# Patient Record
Sex: FEMALE | Race: WHITE | NOT HISPANIC OR LATINO | Employment: FULL TIME | RURAL
[De-identification: names, ages, dates, MRNs, and addresses within clinical notes are randomized per-mention and may not be internally consistent; named-entity substitution may affect disease eponyms.]

---

## 2020-06-04 ENCOUNTER — HISTORICAL (OUTPATIENT)
Dept: ADMINISTRATIVE | Facility: HOSPITAL | Age: 50
End: 2020-06-04

## 2020-11-10 ENCOUNTER — HISTORICAL (OUTPATIENT)
Dept: ADMINISTRATIVE | Facility: HOSPITAL | Age: 50
End: 2020-11-10

## 2020-11-11 LAB — SARS-COV+SARS-COV-2 AG RESP QL IA.RAPID: NEGATIVE

## 2021-02-10 ENCOUNTER — HISTORICAL (OUTPATIENT)
Dept: ADMINISTRATIVE | Facility: HOSPITAL | Age: 51
End: 2021-02-10

## 2021-02-12 LAB
DSDNA IGG SERPL IA-ACNC: 13 IU (ref 0–24)
DSDNA IGG SERPL IA-ACNC: NEGATIVE [IU]/ML
ENA AB SER QL IA: 2.7 EUS (ref 0–19.9)
ENA AB SER QL IA: NEGATIVE

## 2021-03-12 ENCOUNTER — HISTORICAL (OUTPATIENT)
Dept: ADMINISTRATIVE | Facility: HOSPITAL | Age: 51
End: 2021-03-12

## 2021-03-16 LAB
LAB AP CLINICAL INFORMATION: NORMAL
LAB AP GENERAL CAT - HISTORICAL: NORMAL
LAB AP INTERPRETATION/RESULT - HISTORICAL: NEGATIVE
LAB AP SPECIMEN ADEQUACY - HISTORICAL: NORMAL
LAB AP SPECIMEN SUBMITTED - HISTORICAL: NORMAL
PAP RECOMMENDATION EXT: NORMAL

## 2021-07-12 RX ORDER — TRIAMCINOLONE ACETONIDE 1 MG/G
OINTMENT TOPICAL 2 TIMES DAILY
Qty: 80 G | Refills: 5 | Status: SHIPPED | OUTPATIENT
Start: 2021-07-12 | End: 2023-04-18

## 2023-04-18 ENCOUNTER — OFFICE VISIT (OUTPATIENT)
Dept: FAMILY MEDICINE | Facility: CLINIC | Age: 53
End: 2023-04-18
Payer: COMMERCIAL

## 2023-04-18 VITALS
TEMPERATURE: 97 F | HEART RATE: 96 BPM | SYSTOLIC BLOOD PRESSURE: 138 MMHG | RESPIRATION RATE: 18 BRPM | HEIGHT: 67 IN | WEIGHT: 282 LBS | OXYGEN SATURATION: 97 % | BODY MASS INDEX: 44.26 KG/M2 | DIASTOLIC BLOOD PRESSURE: 88 MMHG

## 2023-04-18 DIAGNOSIS — Z86.39 HISTORY OF ELEVATED GLUCOSE: ICD-10-CM

## 2023-04-18 DIAGNOSIS — E66.01 MORBID OBESITY WITH BMI OF 40.0-44.9, ADULT: Primary | ICD-10-CM

## 2023-04-18 DIAGNOSIS — I10 HYPERTENSION, UNSPECIFIED TYPE: ICD-10-CM

## 2023-04-18 DIAGNOSIS — F41.1 GENERALIZED ANXIETY DISORDER: ICD-10-CM

## 2023-04-18 DIAGNOSIS — R53.83 FATIGUE, UNSPECIFIED TYPE: ICD-10-CM

## 2023-04-18 DIAGNOSIS — E88.810 METABOLIC SYNDROME: ICD-10-CM

## 2023-04-18 DIAGNOSIS — E55.9 VITAMIN D DEFICIENCY: ICD-10-CM

## 2023-04-18 DIAGNOSIS — E78.2 MIXED HYPERLIPIDEMIA: ICD-10-CM

## 2023-04-18 LAB
25(OH)D3 SERPL-MCNC: 28 NG/ML
ALBUMIN SERPL BCP-MCNC: 3.9 G/DL (ref 3.5–5)
ALBUMIN/GLOB SERPL: 1 {RATIO}
ALP SERPL-CCNC: 68 U/L (ref 41–108)
ALT SERPL W P-5'-P-CCNC: 36 U/L (ref 13–56)
ANION GAP SERPL CALCULATED.3IONS-SCNC: 9 MMOL/L (ref 7–16)
AST SERPL W P-5'-P-CCNC: 24 U/L (ref 15–37)
BASOPHILS # BLD AUTO: 0.04 K/UL (ref 0–0.2)
BASOPHILS NFR BLD AUTO: 0.6 % (ref 0–1)
BILIRUB SERPL-MCNC: 0.7 MG/DL (ref ?–1.2)
BUN SERPL-MCNC: 8 MG/DL (ref 7–18)
BUN/CREAT SERPL: 13 (ref 6–20)
CALCIUM SERPL-MCNC: 9.4 MG/DL (ref 8.5–10.1)
CHLORIDE SERPL-SCNC: 104 MMOL/L (ref 98–107)
CHOLEST SERPL-MCNC: 215 MG/DL (ref 0–200)
CHOLEST/HDLC SERPL: 3.8 {RATIO}
CO2 SERPL-SCNC: 32 MMOL/L (ref 21–32)
CREAT SERPL-MCNC: 0.62 MG/DL (ref 0.55–1.02)
DIFFERENTIAL METHOD BLD: ABNORMAL
EGFR (NO RACE VARIABLE) (RUSH/TITUS): 109 ML/MIN/1.73M²
EOSINOPHIL # BLD AUTO: 0.12 K/UL (ref 0–0.5)
EOSINOPHIL NFR BLD AUTO: 1.7 % (ref 1–4)
ERYTHROCYTE [DISTWIDTH] IN BLOOD BY AUTOMATED COUNT: 12.9 % (ref 11.5–14.5)
EST. AVERAGE GLUCOSE BLD GHB EST-MCNC: 87 MG/DL
GLOBULIN SER-MCNC: 3.8 G/DL (ref 2–4)
GLUCOSE SERPL-MCNC: 65 MG/DL (ref 74–106)
HBA1C MFR BLD HPLC: 5.2 % (ref 4.5–6.6)
HCT VFR BLD AUTO: 43.4 % (ref 38–47)
HDLC SERPL-MCNC: 57 MG/DL (ref 40–60)
HGB BLD-MCNC: 14.2 G/DL (ref 12–16)
IMM GRANULOCYTES # BLD AUTO: 0.01 K/UL (ref 0–0.04)
IMM GRANULOCYTES NFR BLD: 0.1 % (ref 0–0.4)
LDLC SERPL CALC-MCNC: 98 MG/DL
LDLC/HDLC SERPL: 1.7 {RATIO}
LYMPHOCYTES # BLD AUTO: 3.35 K/UL (ref 1–4.8)
LYMPHOCYTES NFR BLD AUTO: 46.7 % (ref 27–41)
MCH RBC QN AUTO: 30.3 PG (ref 27–31)
MCHC RBC AUTO-ENTMCNC: 32.7 G/DL (ref 32–36)
MCV RBC AUTO: 92.7 FL (ref 80–96)
MONOCYTES # BLD AUTO: 0.75 K/UL (ref 0–0.8)
MONOCYTES NFR BLD AUTO: 10.4 % (ref 2–6)
MPC BLD CALC-MCNC: 8.3 FL (ref 9.4–12.4)
NEUTROPHILS # BLD AUTO: 2.91 K/UL (ref 1.8–7.7)
NEUTROPHILS NFR BLD AUTO: 40.5 % (ref 53–65)
NONHDLC SERPL-MCNC: 158 MG/DL
NRBC # BLD AUTO: 0 X10E3/UL
NRBC, AUTO (.00): 0 %
PLATELET # BLD AUTO: 418 K/UL (ref 150–400)
POTASSIUM SERPL-SCNC: 4.5 MMOL/L (ref 3.5–5.1)
PROT SERPL-MCNC: 7.7 G/DL (ref 6.4–8.2)
RBC # BLD AUTO: 4.68 M/UL (ref 4.2–5.4)
SODIUM SERPL-SCNC: 140 MMOL/L (ref 136–145)
TRIGL SERPL-MCNC: 301 MG/DL (ref 35–150)
TSH SERPL DL<=0.005 MIU/L-ACNC: 0.91 UIU/ML (ref 0.36–3.74)
VLDLC SERPL-MCNC: 60 MG/DL
WBC # BLD AUTO: 7.18 K/UL (ref 4.5–11)

## 2023-04-18 PROCEDURE — 83036 HEMOGLOBIN GLYCOSYLATED A1C: CPT | Mod: ,,, | Performed by: CLINICAL MEDICAL LABORATORY

## 2023-04-18 PROCEDURE — 85025 COMPLETE CBC W/AUTO DIFF WBC: CPT | Mod: ,,, | Performed by: CLINICAL MEDICAL LABORATORY

## 2023-04-18 PROCEDURE — 80053 COMPREHEN METABOLIC PANEL: CPT | Mod: ,,, | Performed by: CLINICAL MEDICAL LABORATORY

## 2023-04-18 PROCEDURE — 85025 CBC WITH DIFFERENTIAL: ICD-10-PCS | Mod: ,,, | Performed by: CLINICAL MEDICAL LABORATORY

## 2023-04-18 PROCEDURE — 84443 ASSAY THYROID STIM HORMONE: CPT | Mod: ,,, | Performed by: CLINICAL MEDICAL LABORATORY

## 2023-04-18 PROCEDURE — 82306 VITAMIN D 25 HYDROXY: CPT | Mod: ,,, | Performed by: CLINICAL MEDICAL LABORATORY

## 2023-04-18 PROCEDURE — 83036 HEMOGLOBIN A1C: ICD-10-PCS | Mod: ,,, | Performed by: CLINICAL MEDICAL LABORATORY

## 2023-04-18 PROCEDURE — 82306 VITAMIN D: ICD-10-PCS | Mod: ,,, | Performed by: CLINICAL MEDICAL LABORATORY

## 2023-04-18 PROCEDURE — 80061 LIPID PANEL: ICD-10-PCS | Mod: ,,, | Performed by: CLINICAL MEDICAL LABORATORY

## 2023-04-18 PROCEDURE — 99204 OFFICE O/P NEW MOD 45 MIN: CPT | Mod: ,,, | Performed by: NURSE PRACTITIONER

## 2023-04-18 PROCEDURE — 80053 COMPREHENSIVE METABOLIC PANEL: ICD-10-PCS | Mod: ,,, | Performed by: CLINICAL MEDICAL LABORATORY

## 2023-04-18 PROCEDURE — 99204 PR OFFICE/OUTPT VISIT, NEW, LEVL IV, 45-59 MIN: ICD-10-PCS | Mod: ,,, | Performed by: NURSE PRACTITIONER

## 2023-04-18 PROCEDURE — 84443 TSH: ICD-10-PCS | Mod: ,,, | Performed by: CLINICAL MEDICAL LABORATORY

## 2023-04-18 PROCEDURE — 80061 LIPID PANEL: CPT | Mod: ,,, | Performed by: CLINICAL MEDICAL LABORATORY

## 2023-04-18 RX ORDER — BUPROPION HYDROCHLORIDE 150 MG/1
150 TABLET ORAL DAILY
Qty: 30 TABLET | Refills: 2 | Status: SHIPPED | OUTPATIENT
Start: 2023-04-18 | End: 2023-06-01

## 2023-04-18 RX ORDER — PAROXETINE HYDROCHLORIDE 40 MG/1
40 TABLET, FILM COATED ORAL
COMMUNITY
Start: 2023-01-02 | End: 2023-04-18

## 2023-04-18 RX ORDER — HYDROCHLOROTHIAZIDE 12.5 MG/1
TABLET ORAL
COMMUNITY
End: 2023-04-18

## 2023-04-18 RX ORDER — LISINOPRIL 20 MG/1
20 TABLET ORAL DAILY
Qty: 30 TABLET | Refills: 2 | Status: SHIPPED | OUTPATIENT
Start: 2023-04-18 | End: 2023-08-08 | Stop reason: SDUPTHER

## 2023-04-18 RX ORDER — HYDROCHLOROTHIAZIDE 25 MG/1
25 TABLET ORAL DAILY
Qty: 30 TABLET | Refills: 2 | Status: SHIPPED | OUTPATIENT
Start: 2023-04-18 | End: 2024-04-17

## 2023-04-18 RX ORDER — PHENTERMINE HYDROCHLORIDE 37.5 MG/1
37.5 TABLET ORAL
Qty: 30 TABLET | Refills: 0 | Status: SHIPPED | OUTPATIENT
Start: 2023-04-18 | End: 2023-06-01

## 2023-04-19 RX ORDER — ATORVASTATIN CALCIUM 20 MG/1
20 TABLET, FILM COATED ORAL DAILY
Qty: 90 TABLET | Refills: 3 | Status: SHIPPED | OUTPATIENT
Start: 2023-04-19 | End: 2024-04-18

## 2023-04-19 RX ORDER — ERGOCALCIFEROL 1.25 MG/1
50000 CAPSULE ORAL
Qty: 12 CAPSULE | Refills: 1 | Status: SHIPPED | OUTPATIENT
Start: 2023-04-19

## 2023-05-08 PROBLEM — Z86.39 HISTORY OF ELEVATED GLUCOSE: Status: ACTIVE | Noted: 2023-05-08

## 2023-05-08 PROBLEM — R53.83 FATIGUE: Status: ACTIVE | Noted: 2023-05-08

## 2023-05-08 PROBLEM — E66.01 MORBID OBESITY WITH BMI OF 40.0-44.9, ADULT: Status: ACTIVE | Noted: 2023-05-08

## 2023-05-08 PROBLEM — E55.9 VITAMIN D DEFICIENCY: Status: ACTIVE | Noted: 2023-05-08

## 2023-05-08 PROBLEM — F41.1 GENERALIZED ANXIETY DISORDER: Status: ACTIVE | Noted: 2023-05-08

## 2023-05-08 PROBLEM — E78.2 MIXED HYPERLIPIDEMIA: Status: ACTIVE | Noted: 2023-05-08

## 2023-05-08 PROBLEM — I10 HYPERTENSION: Status: ACTIVE | Noted: 2023-05-08

## 2023-05-08 NOTE — ASSESSMENT & PLAN NOTE
EMERGENCY DEPARTMENT ENCOUNTER      NAME: Adalgisa Solano  AGE: 86 year old female  YOB: 1935  MRN: [id number]  EVALUATION DATE & TIME: 5/5/2022  2:11 AM    PCP: Shai Ellington    ED PROVIDER: Anoop Justice M.D.      Chief Complaint   Patient presents with     Hypoxia         FINAL IMPRESSION:  1. Pneumonia of left lower lobe due to infectious organism    2. COPD exacerbation (H)    3. Hypoxia          ED COURSE & MEDICAL DECISION MAKING:    Pertinent Labs & Imaging studies reviewed. (See chart for details)  86 year old female presents to the Emergency Department for evaluation of shortness of breath.  Patient was hypoxic on arrival down into the 80s.  Very tight.  Did give her prednisone, DuoNeb and albuterol with some improvement.  Still requiring facemask for oxygenation.  Did do a CT scan of her chest as it was unclear if she had been on anticoagulation or not.  There is no PE but there is a left lower lobe infiltrate.  Of note her INR is up to 3.26.  Unclear what is causing this as it looks like she had been on Xarelto I do not see Coumadin listed.  Patient also found to have a sodium 128.  Will be admitted for pneumonia.  Discussed with the resident accepted patient.  Given IV antibiotics for likely hospital-acquired pneumonia.    2:12 AM I met with the patient to gather history and to perform my initial exam. I discussed the plan for care while in the Emergency Department. PPE: gloves, N95, and eye protection.  4:39 AM I discussed case with Dr. Reza, resident.    At the conclusion of the encounter I discussed the results of all of the tests and the disposition. The questions were answered. The patient or family acknowledged understanding and was agreeable with the care plan.       Critical Care     Performed by: Dr Anoop Justice  Authorized by: Dr Anoop Justice  Total critical care time: 90 minutes  Critical care was necessary to treat or prevent imminent or life-threatening  Start wellbutrin 150 mg daily   deterioration of the following conditions:  Respiratory failure with hypoxia  Critical care was time spent personally by me on the following activities: development of treatment plan with patient or surrogate, discussions with consultants, examination of patient, evaluation of patient's response to treatment, obtaining history from patient or surrogate, ordering and performing treatments and interventions, ordering and review of laboratory studies, ordering and review of radiographic studies, re-evaluation of patient's condition and monitoring for potential decompensation.  Critical care time was exclusive of separately billable procedures and treating other patients.     MEDICATIONS GIVEN IN THE EMERGENCY:  Medications   ceFEPIme (MAXIPIME) 2 g vial to attach to  ml bag for ADULTS or 50 ml bag for PEDS (2 g Intravenous New Bag 5/5/22 5976)   ipratropium - albuterol 0.5 mg/2.5 mg/3 mL (DUONEB) neb solution 3 mL (3 mLs Nebulization Given 5/5/22 0230)   albuterol (PROVENTIL) neb solution 5 mg (5 mg Nebulization Given 5/5/22 0230)   predniSONE (DELTASONE) tablet 60 mg (60 mg Oral Given 5/5/22 0251)   iopamidol (ISOVUE-370) solution 100 mL (75 mLs Intravenous Given 5/5/22 0315)       NEW PRESCRIPTIONS STARTED AT TODAY'S ER VISIT  New Prescriptions    No medications on file          =================================================================    HPI    Patient information was obtained from: Patient    Use of : N/A        Adalgisa Solano is a 86 year old female with a pertinent history of chronic atrial fibrillation, HTN, chronic heart failure with preserved ejection fraction, dyslipidemia, peripheral arterial disease, pulmonary hypertension, DM2, COPD, GERD, chronic pain, who presents to this ED via EMS for evaluation of shortness of breath, hypoxia.  Patient found to be hypoxic tonight.  Having some worsening shortness of breath.  Denies any worsening cough.  Has been tapering off of  prednisone.  Now only on 5 mg.  No longer on antibiotics.  Denies any chest pain.  No abdominal pain.  Some nausea but no vomiting.  Denies any fever.  Patient does have a history of A. fib.  Unclear if she has been on anticoagulation.  Did not see it noted in her paperwork from her care facility    Per chart review, patient was seen at [facility] from 4/13 - 4/22 for complex and persistent bronchiectasis with exacerbation. Admitted for worsening shortness of breath with productive cough secondary to bronchiectasis and COPD exacerbation and right lower lobe pneumonia. Patient was treated with IV systemic steroids now switched to tapered dose of oral steroid. Resumed vest therapy, scheduled bronchodilator/DuoNeb 4 times a day. Sputum culture grew Pseudomonas and Enterococcus. On IV cefepime until 4/23. Tested positive for coronavirus, but negative for COVID-19. Chronically on 2 to 3 L of oxygen now on 4 L. Resume gentle diuresis, was on Lasix 40 mg every other day now on daily. Echocardiogram was unremarkable. Evaluated by pulmonology and palliative care. Recommended to follow-up with pulmonology closely as outpatient.  Patient also has recurrent anxiety.  Ativan 0.25 mg every 6 hours as needed for acute anxiety, mirtazapine 7.5 mg daily for 1 week then 15 mg daily, Seroquel 12.5 mg twice a day for 3 days started. Resume Abilify 5 mg daily. Patient has chronic atrial fibrillation. Diltiazem dose increased to 240 mg daily for tachycardia. Patient discharged to skilled nursing facility.    REVIEW OF SYSTEMS   Review of Systems   Constitutional: Negative for fever.   Respiratory: Positive for cough and shortness of breath.    Cardiovascular: Negative for chest pain.   Gastrointestinal: Positive for nausea. Negative for abdominal pain.   All other systems reviewed and are negative.       PAST MEDICAL HISTORY:  Past Medical History:   Diagnosis Date     Acute and chronic respiratory failure with hypoxia (H)       Acute blood loss anemia 1/15/2019     Acute bronchitis 1/15/2019     Anemia     pernicious anemia     Anticoagulated 3/23/2022     Anxiety      Arm skin lesion, right      Arnold-Chiari malformation (H) 1998     Arthritis      Atrial fibrillation (H) 02/2017     Avascular necrosis of bone (H)      Breast cyst      Breast lump      Candidal skin infection      Cellulitis of left lower extremity 1/28/2019     CHF (congestive heart failure) (H)     diastolic and systolic     Chronic bronchitis (H)     & acute     Chronic diastolic heart failure (H)     diastolic chf     Chronic pain syndrome      Chronic respiratory failure with hypoxia (H) 3/13/2017     COPD (chronic obstructive pulmonary disease) (H)      COPD exacerbation (H)      Depression      Diet-controlled type 2 diabetes mellitus (H)      Drug induced constipation      DVT of axillary vein, acute (H)     left     DVT of axillary vein, acute left (H)      Edema      Encounter for palliative care      Erysipelas      Fracture of femoral neck, left (H)      Generalized muscle weakness      GERD (gastroesophageal reflux disease)      History of blood clots      Hyperlipidemia      Hypertension      Left hip pain      Lumbar spinal stenosis      PAD (peripheral artery disease) (H)      Peripheral arterial disease (H) 10/28/2015     Psoriasis     arms      Pulmonary hypertension (H) 3/5/2018     Recurrent major depressive episodes (H)     Created by Conversion  Replacement Utility updated for latest IMO load     Schizoaffective disorder, bipolar type (H) 6/11/2015     Slow transit constipation      Stasis dermatitis of both legs      Status post total hip replacement, left 1/3/2019     Type 2 diabetes mellitus without complication (H) 8/2/2016     Vitamin B12 deficiency      Volume overload        PAST SURGICAL HISTORY:  Past Surgical History:   Procedure Laterality Date     BACK SURGERY       BREAST CYST ASPIRATION Left 1994     CERVICAL SPINE SURGERY      for  arnold chiari malformation      SECTION  X3     CHOLECYSTECTOMY       COLONOSCOPY       EXCISE LESION UPPER EXTREMITY Right 2019    Procedure: EXCISION RIGHT ARM LIPOMA;  Surgeon: Andreas Holly MD;  Location: [facility];  Service: General     EYE SURGERY      bilateral cataracts     HAND SURGERY       HERNIORRHAPHY UMBILICAL N/A 2021    Procedure: INCARCERATED UMBILICAL AND;  Surgeon: Andreas Holly MD;  Location: [facility] - [place], [place]     JOINT REPLACEMENT Right     knee - partial     LAPAROSCOPIC HERNIORRHAPHY INCISIONAL Left 3/27/2015    Procedure: ATTEMPTED LAPAROSCOPIC ABDOMINA/FLANK INCISION REPAIR;  Surgeon: Jose Lakhani MD;  Location: [facility] OR;  Service:      LUMBAR FUSION N/A 2014    Procedure: POSTERIOR FUSION / DECOMPRESSION L3-S1 WITH PELVIC FIXATION BILATERAL ;  Surgeon: Rajan Mares MD;  Location: [facility] [place];  Service:      OTHER SURGICAL HISTORY      IR for PAD LOWER EXTREMITY     REPAIR SPIGELIAN HERNIA N/A 2021    Procedure: SPIGELIAN HERNIA REPAIR;  Surgeon: Andreas Holly MD;  Location: [facility] [place], [place]     US ASPIRATION HEMATOMA SEROMA OR FLUID COLLECTION  2020     [facility] OPEN FIXATN PROX END/NECK FEMUR FX Left 2019    Procedure: OPEN REDUCTION INTERNAL FIXATION, FRACTURE LEFT HIP, PERIPROSTHETIC FRACTURE;  Surgeon: Kevin Lackey MD;  Location: [facility] OR;  Service: Orthopedics     [facility] TOTAL HIP ARTHROPLASTY Left 2019    Procedure: LEFT TOTAL HIP ARTHROPLASTY;  Surgeon: Kevin Lakcey MD;  Location: [facility] OR;  Service: Orthopedics     [facility] TOTAL HIP ARTHROPLASTY Right 2019    Procedure: RIGHT TOTAL HIP ARTHROPLASTY;  Surgeon: Kan Quesada MD;  Location: [facility] OR;  Service: Orthopedics     [facility] TOTAL KNEE ARTHROPLASTY Left 10/7/2016    Procedure: TOTAL KNEE ARTHROPLASTY, LEFT;  Surgeon: Kan Quesada MD;  Location: [facility] OR;  Service: Orthopedics           CURRENT MEDICATIONS:     Current Facility-Administered Medications   Medication     ceFEPIme (MAXIPIME) 2 g vial to attach to  ml bag for ADULTS or 50 ml bag for PEDS     Current Outpatient Medications   Medication     acetaminophen (TYLENOL) 500 MG tablet     albuterol (PROVENTIL) (2.5 MG/3ML) 0.083% neb solution     ARIPiprazole (ABILIFY) 5 MG tablet     atorvastatin (LIPITOR) 20 MG tablet     Budeson-Glycopyrrol-Formoterol (BREZTRI AEROSPHERE) 160-9-4.8 MCG/ACT AERO     calcium carbonate 600 mg-vitamin D 400 units (CALTRATE) 600-400 MG-UNIT per tablet     cyanocobalamin (VITAMIN B-12) 500 MCG tablet     diltiazem ER (DILT-XR) 240 MG 24 hr ER beaded capsule     docusate sodium (COLACE) 100 MG capsule     furosemide (LASIX) 40 MG tablet     gabapentin (NEURONTIN) 300 MG capsule     guaiFENesin (MUCINEX) 600 MG 12 hr tablet     insulin aspart (NOVOLOG PEN) 100 UNIT/ML pen     ipratropium - albuterol 0.5 mg/2.5 mg/3 mL (DUONEB) 0.5-2.5 (3) MG/3ML neb solution     LORazepam (ATIVAN) 0.5 MG tablet     melatonin 1 MG TABS tablet     metFORMIN (GLUCOPHAGE) 500 MG tablet     mirtazapine (REMERON) 7.5 MG tablet     mirtazapine (REMERON) 7.5 MG tablet     omeprazole (PRILOSEC) 20 MG DR capsule     phenylephrine-shark liver oil-mineral oil-petrolatum (PREPARATION H) 0.25-3-14-71.9 % rectal ointment     polyethylene glycol (MIRALAX) 17 GM/Dose powder     predniSONE (DELTASONE) 10 MG tablet     QUEtiapine (SEROQUEL) 25 MG tablet     sodium chloride (NEBUSAL) 3 % neb solution     spironolactone (ALDACTONE) 25 MG tablet     Vitamin D3 (CHOLECALCIFEROL) 25 mcg (1000 units) tablet     XARELTO ANTICOAGULANT 20 MG TABS tablet         ALLERGIES:  Allergies   Allergen Reactions     Tramadol Nausea     Tuberculin Tests      Amoxicillin Itching and Rash     Levofloxacin Itching and Rash     Penicillins Itching and Rash     Has tolerated ceftriaxone.       FAMILY HISTORY:  Family History   Problem Relation Age of Onset     Coronary Artery Disease Mother       Coronary Artery Disease Father        SOCIAL HISTORY:   Social History     Socioeconomic History     Marital status:    Tobacco Use     Smoking status: Former Smoker     Quit date: 2006     Years since quittin.3     Smokeless tobacco: Never Used     Tobacco comment: quit 2006   Vaping Use     Vaping Use: Never used   Substance and Sexual Activity     Alcohol use: No     Drug use: No   Social History Narrative           VITALS:  /58   Pulse 96   Temp 98.3  F (36.8  C) (Oral)   Resp 29   SpO2 98%     PHYSICAL EXAM    Physical Exam  Constitutional:       General: She is in acute distress.      Appearance: She is ill-appearing. She is not diaphoretic.   HENT:      Head: Atraumatic.      Mouth/Throat:      Pharynx: No oropharyngeal exudate.   Eyes:      General: No scleral icterus.     Pupils: Pupils are equal, round, and reactive to light.   Cardiovascular:      Rate and Rhythm: Tachycardia present.      Heart sounds: Normal heart sounds.   Pulmonary:      Effort: Respiratory distress present.      Breath sounds: Wheezing present.   Abdominal:      Palpations: Abdomen is soft.      Tenderness: There is no abdominal tenderness. There is no guarding or rebound.   Musculoskeletal:         General: No tenderness.   Skin:     General: Skin is warm.      Findings: No rash.   Neurological:      General: No focal deficit present.      Mental Status: She is alert.           LAB:  All pertinent labs reviewed and interpreted.  Labs Ordered and Resulted from Time of ED Arrival to Time of ED Departure   COMPREHENSIVE METABOLIC PANEL - Abnormal       Result Value    Sodium 128 (*)     Potassium 4.8      Chloride 86 (*)     Carbon Dioxide (CO2) 30      Anion Gap 12      Urea Nitrogen 15      Creatinine 0.65      Calcium 8.6      Glucose 137 (*)     Alkaline Phosphatase 58      AST 21      ALT 29      Protein Total 5.9 (*)     Albumin 2.8 (*)     Bilirubin Total 0.9      GFR Estimate 86     INR -  Abnormal    INR 3.26 (*)    PARTIAL THROMBOPLASTIN TIME - Abnormal    aPTT 45 (*)    CBC WITH PLATELETS AND DIFFERENTIAL - Abnormal    WBC Count 10.1      RBC Count 3.97      Hemoglobin 11.6 (*)     Hematocrit 36.5      MCV 92      MCH 29.2      MCHC 31.8      RDW 15.7 (*)     Platelet Count 208      % Neutrophils 86      % Lymphocytes 5      % Monocytes 7      % Eosinophils 1      % Basophils 0      % Immature Granulocytes 1      NRBCs per 100 WBC 0      Absolute Neutrophils 8.8 (*)     Absolute Lymphocytes 0.5 (*)     Absolute Monocytes 0.7      Absolute Eosinophils 0.1      Absolute Basophils 0.0      Absolute Immature Granulocytes 0.1      Absolute NRBCs 0.0     TROPONIN I - Normal    Troponin I 0.01     B-TYPE NATRIURETIC PEPTIDE ( EAST ONLY) - Normal         INFLUENZA A/B & SARS-COV2 PCR MULTIPLEX   BLOOD CULTURE   BLOOD CULTURE       RADIOLOGY:  Reviewed all pertinent imaging. Please see official radiology report.  CT Chest Pulmonary Embolism w Contrast   Final Result   IMPRESSION:   1.  No evidence of pulmonary embolus.   2.  Left lower lobe predominant new airspace disease compatible with pneumonia or aspiration. Follow-up to resolution recommended.   3.  Likely stable right upper lobe pulmonary nodules measuring up to 11 mm in size. Continued short-term follow-up CT versus PET CT recommended per Fleischner Society guidelines.      REFERENCE:   Guidelines for Management of Incidental Pulmonary Nodules Detected on CT Images: From the Fleischner Society 2017.    Guidelines apply to incidental nodules in patients who are 35 years or older.   Guidelines do not apply to lung cancer screening, patients with immunosuppression, or patients with known primary cancer.      Nodule size 6 mm or larger   Low-risk patients: Follow-up CT at 3-6 months, then consider CT at 18-24 months.   High-risk patients: Follow-up CT at 3-6 months, then at 18-24 months if no change.   -Use most suspicious nodule as guide to  management.      Consider referral to lung nodule clinic.          EKG:    Performed at: 218  Impression: Atrial fibrillation with ventricular to 115.  Compared to previous heart rate is increased otherwise unchanged.  This is dated April 21, 2022  A. fib with a rate of 115.  QRS 72.  QTc 450    I have independently reviewed and interpreted the EKG(s) documented above.    PROCEDURES:         I, Mar Quick, am serving as a scribe to document services personally performed by Dr. Anoop Justice, based on my observation and the provider's statements to me. I, Anoop Justice MD attest that Mar Quick is acting in a scribe capacity, has observed my performance of the services and has documented them in accordance with my direction.    Anoop Justice M.D.  Emergency Medicine  [facility] EMERGENCY ROOM  [address] [id number] [phone]  Dept: [phone]     Anoop Justice MD  05/05/22 1912

## 2023-05-08 NOTE — ASSESSMENT & PLAN NOTE
adipex po daily  Discussed low calorie diet   Exercise at least 30 min daily x 5 days weekly  Increase water intake    Follow up 1 mo

## 2023-05-08 NOTE — PROGRESS NOTES
MILENA Nation   [facility]  [address] [address] [address]  [phone]      PATIENT NAME: Linnea Flowers  : 1970  DATE: 23  MRN: [id number]      Billing Provider: MILENA Nation  Level of Service:   Patient PCP Information       Provider PCP Type    MILENA Nation General            Reason for Visit / Chief Complaint: Establish Care (States her paroxetine 40mg does not seem to be helping. ) and Weight Loss (Pt is wanting to discuss weight loss options. /States she has tried adipex in the past and it helped, but caused constipation. Is willing to retry if there is no other option. /States she was diagnosed as pre diabetic. Pt states she has not had an A1C drawn in years. )       Update PCP  Update Chief Complaint         History of Present Illness / Problem Focused Workflow     52 year old female presents to establish care  States she has been on paxil for many years but feels it is not helping  She complaints of having fatigue as well  Reports she has taken adipex in the past but did cause some constipation  Blood pressure is  a little elevated today     Hx of hypertension, hyperlipidemia      Review of Systems     Review of Systems   Constitutional:  Positive for fatigue. Negative for fever.   HENT:  Negative for congestion.    Respiratory:  Negative for cough and shortness of breath.    Cardiovascular:  Negative for palpitations.   Gastrointestinal:  Negative for abdominal pain, constipation and diarrhea.   Endocrine: Negative for polydipsia and polyuria.   Musculoskeletal:  Positive for arthralgias. Negative for gait problem.   Neurological:  Negative for dizziness, weakness and headaches.   Psychiatric/Behavioral:  Negative for agitation and dysphoric mood.      Medical / Social / Family History     Past Medical History:   Diagnosis Date    Hyperlipidemia     Hypertension        Past Surgical History:   Procedure  Laterality Date    dnc       TONSILLECTOMY         Social History  Ms.  reports that she has never smoked. She has never used smokeless tobacco. She reports current alcohol use. She reports that she does not currently use drugs.    Family History  Ms.'s family history is not on file.    Medications and Allergies     Medications  Outpatient Medications Marked as Taking for the 4/18/23 encounter (Office Visit) with MILENA Nation   Medication Sig Dispense Refill    [DISCONTINUED] lisinopriL (PRINIVIL,ZESTRIL) 20 MG tablet Take 1 tablet by mouth once daily 30 tablet 0    [DISCONTINUED] paroxetine (PAXIL) 40 MG tablet Take 40 mg by mouth.         Allergies  Review of patient's allergies indicates:   Allergen Reactions    Codeine        Physical Examination     Vitals:    04/18/23 1412   BP: 138/88   Pulse: 96   Resp: 18   Temp: 97.2 °F (36.2 °C)     Physical Exam  Constitutional:       General: She is not in acute distress.     Appearance: She is obese.   HENT:      Head: Normocephalic.      Nose: Nose normal.      Mouth/Throat:      Mouth: Mucous membranes are moist.   Eyes:      Extraocular Movements: Extraocular movements intact.   Cardiovascular:      Rate and Rhythm: Normal rate.   Pulmonary:      Effort: No respiratory distress.   Abdominal:      General: Bowel sounds are normal.      Palpations: Abdomen is soft.   Musculoskeletal:         General: Normal range of motion.      Cervical back: Neck supple.   Skin:     General: Skin is warm.   Neurological:      Mental Status: She is alert and oriented to person, place, and time.   Psychiatric:         Behavior: Behavior normal.         Imaging / Labs         Assessment and Plan (including Health Maintenance)      Problem List  Smart Sets  Document Outside HM   :    Health Maintenance Due   Topic Date Due    Hepatitis C Screening  Never done    Cervical Cancer Screening  Never done    COVID-19 Vaccine (1) Never done    HIV Screening  Never done    TETANUS VACCINE   Never done    Colorectal Cancer Screening  Never done    Mammogram  06/04/2021    Shingles Vaccine (1 of 2) Never done       Problem List Items Addressed This Visit          Psychiatric    Generalized anxiety disorder    Current Assessment & Plan     Start wellbutrin 150 mg daily           Relevant Medications    buPROPion (WELLBUTRIN XL) 150 MG TB24 tablet       Cardiac/Vascular    Hypertension    Current Assessment & Plan     Start lisinopril 20 mg daily             Relevant Medications    hydroCHLOROthiazide (HYDRODIURIL) 25 MG tablet    lisinopriL (PRINIVIL,ZESTRIL) 20 MG tablet    Other Relevant Orders    Lipid Panel (Completed)    Comprehensive Metabolic Panel (Completed)    Mixed hyperlipidemia    Relevant Medications    atorvastatin (LIPITOR) 20 MG tablet       Endocrine    Morbid obesity with BMI of 40.0-44.9, adult - Primary    Current Assessment & Plan     adipex po daily  Discussed low calorie diet   Exercise at least 30 min daily x 5 days weekly  Increase water intake    Follow up 1 mo           Relevant Medications    phentermine (ADIPEX-P) 37.5 mg tablet    Other Relevant Orders    TSH (Completed)    CBC Auto Differential (Completed)    History of elevated glucose    Current Assessment & Plan     Hx of elevated Hgb a1c  Will get labs today and treat as indicated           Relevant Orders    Hemoglobin A1C (Completed)    Vitamin D deficiency    Relevant Medications    ergocalciferol (ERGOCALCIFEROL) 50,000 unit Cap       Other    Fatigue    Relevant Orders    TSH (Completed)    CBC Auto Differential (Completed)    Vitamin D (Completed)     Other Visit Diagnoses       Metabolic syndrome        Relevant Medications    phentermine (ADIPEX-P) 37.5 mg tablet            Health Maintenance Topics with due status: Not Due       Topic Last Completion Date    Hemoglobin A1c (Diabetic Prevention Screening) 04/18/2023    Lipid Panel 04/18/2023    Influenza Vaccine Not Due       Future Appointments   Date Time  Provider Department [place]   5/18/2023 10:45 AM MILENA Nation [facility]          Signature:  MILENA Nation [facility] [address] [place] [address]  [phone]    Date of encounter: 4/18/23

## 2023-06-01 ENCOUNTER — OFFICE VISIT (OUTPATIENT)
Dept: FAMILY MEDICINE | Facility: CLINIC | Age: 53
End: 2023-06-01
Payer: COMMERCIAL

## 2023-06-01 VITALS
TEMPERATURE: 98 F | HEIGHT: 67 IN | SYSTOLIC BLOOD PRESSURE: 126 MMHG | OXYGEN SATURATION: 97 % | WEIGHT: 280 LBS | HEART RATE: 92 BPM | BODY MASS INDEX: 43.95 KG/M2 | RESPIRATION RATE: 18 BRPM | DIASTOLIC BLOOD PRESSURE: 82 MMHG

## 2023-06-01 DIAGNOSIS — N89.8 VAGINAL DRYNESS: ICD-10-CM

## 2023-06-01 DIAGNOSIS — E88.810 METABOLIC SYNDROME: ICD-10-CM

## 2023-06-01 DIAGNOSIS — F41.1 GENERALIZED ANXIETY DISORDER: ICD-10-CM

## 2023-06-01 DIAGNOSIS — R51.9 FREQUENT HEADACHES: ICD-10-CM

## 2023-06-01 DIAGNOSIS — E66.01 MORBID OBESITY WITH BMI OF 40.0-44.9, ADULT: Primary | ICD-10-CM

## 2023-06-01 PROCEDURE — 99213 PR OFFICE/OUTPT VISIT, EST, LEVL III, 20-29 MIN: ICD-10-PCS | Mod: ,,, | Performed by: NURSE PRACTITIONER

## 2023-06-01 PROCEDURE — 99213 OFFICE O/P EST LOW 20 MIN: CPT | Mod: ,,, | Performed by: NURSE PRACTITIONER

## 2023-06-01 RX ORDER — BREXPIPRAZOLE 1 MG/1
1 TABLET ORAL DAILY
Qty: 30 TABLET | Refills: 5 | Status: SHIPPED | OUTPATIENT
Start: 2023-06-01

## 2023-06-01 RX ORDER — BUPROPION HYDROCHLORIDE 300 MG/1
300 TABLET ORAL DAILY
Qty: 30 TABLET | Refills: 5 | Status: SHIPPED | OUTPATIENT
Start: 2023-06-01 | End: 2024-05-31

## 2023-06-01 RX ORDER — METFORMIN HYDROCHLORIDE 500 MG/1
500 TABLET ORAL 2 TIMES DAILY WITH MEALS
Qty: 60 TABLET | Refills: 5 | Status: SHIPPED | OUTPATIENT
Start: 2023-06-01 | End: 2024-05-31

## 2023-06-01 RX ORDER — ESTRADIOL 0.1 MG/G
2 CREAM VAGINAL DAILY
Qty: 60 G | Refills: 5 | Status: SHIPPED | OUTPATIENT
Start: 2023-06-01 | End: 2024-05-31

## 2023-06-01 RX ORDER — TOPIRAMATE 50 MG/1
50 TABLET, FILM COATED ORAL 2 TIMES DAILY
Qty: 60 TABLET | Refills: 5 | Status: SHIPPED | OUTPATIENT
Start: 2023-06-01 | End: 2024-05-31

## 2023-06-01 RX ORDER — SEMAGLUTIDE 0.25 MG/.5ML
0.25 INJECTION, SOLUTION SUBCUTANEOUS
Qty: 1 ML | Refills: 2 | Status: SHIPPED | OUTPATIENT
Start: 2023-06-01

## 2023-06-11 PROBLEM — R51.9 FREQUENT HEADACHES: Status: ACTIVE | Noted: 2023-06-11

## 2023-06-11 PROBLEM — N89.8 VAGINAL DRYNESS: Status: ACTIVE | Noted: 2023-06-11

## 2023-06-12 NOTE — ASSESSMENT & PLAN NOTE
Reports increase anxiety ans stress at home  States she is moving out of family home of over 30 years  Reports she is having severe depression  She does have caregiver with her today to monitor and support her  Will add rexulti daily with increased wellbutrin dosage  Advised her to see psychology  She does not want referral because she if moving to [place] in the next few weeks and does not want to establish care here in [place]

## 2023-06-12 NOTE — PROGRESS NOTES
MILENA Nation   [facility]  [id number] [address] [address]  [phone]      PATIENT NAME: Linnea Flowers  : 1970  DATE: 23  MRN: [id number]      Billing Provider: MILENA Nation  Level of Service:   Patient PCP Information       Provider PCP Type    MILENA Nation General            Reason for Visit / Chief Complaint: Obesity (Wants to discuss other weightloss options), Follow-up (1 month f/u), Medication Problem (Couldn't take adipex r/t constipation./Doesn't feel like her wellbutrin is working well./), and Depression (Admits she has had several suicidal thoughts w/ a plan last month.)       Update PCP  Update Chief Complaint         History of Present Illness / Problem Focused Workflow     52 year old female presents for follow up and weight loss  Would like to try other meds to help with losing weight  Reports increase anxiety and stress at home  States she is moving out of family home of over 30 years  Reports she is having severe depression  She does have caregiver with her today to monitor and support her  Has lost approx 2-3 lbs since last visit    Hx of hypertension, hyperlipidemia        Review of Systems     Review of Systems   Constitutional:  Positive for fatigue. Negative for fever.   HENT:  Negative for congestion.    Respiratory:  Negative for cough and shortness of breath.    Cardiovascular:  Negative for chest pain.   Gastrointestinal:  Positive for constipation. Negative for abdominal pain and diarrhea.   Endocrine: Negative for cold intolerance and heat intolerance.   Musculoskeletal:  Positive for arthralgias. Negative for gait problem.   Allergic/Immunologic: Negative for environmental allergies.   Neurological:  Positive for headaches. Negative for dizziness and weakness.   Psychiatric/Behavioral:  Positive for sleep disturbance and suicidal ideas. The patient is nervous/anxious.      Medical / Social /  Family History     Past Medical History:   Diagnosis Date    Anxiety     Depression     Hyperlipidemia     Hypertension        Past Surgical History:   Procedure Laterality Date    dnc       TONSILLECTOMY         Social History  Ms.  reports that she has never smoked. She has never used smokeless tobacco. She reports current alcohol use. She reports that she does not use drugs.    Family History  Ms.'s family history includes Depression in her daughter, maternal aunt, and mother.    Medications and Allergies     Medications  Outpatient Medications Marked as Taking for the 6/1/23 encounter (Office Visit) with MILENA Nation   Medication Sig Dispense Refill    atorvastatin (LIPITOR) 20 MG tablet Take 1 tablet (20 mg total) by mouth once daily. 90 tablet 3    ergocalciferol (ERGOCALCIFEROL) 50,000 unit Cap Take 1 capsule (50,000 Units total) by mouth every 7 days. 12 capsule 1    hydroCHLOROthiazide (HYDRODIURIL) 25 MG tablet Take 1 tablet (25 mg total) by mouth once daily. 30 tablet 2    lisinopriL (PRINIVIL,ZESTRIL) 20 MG tablet Take 1 tablet (20 mg total) by mouth once daily. 30 tablet 2    [DISCONTINUED] buPROPion (WELLBUTRIN XL) 150 MG TB24 tablet Take 1 tablet (150 mg total) by mouth once daily. 30 tablet 2       Allergies  Review of patient's allergies indicates:   Allergen Reactions    Codeine        Physical Examination     Vitals:    06/01/23 1444   BP: 126/82   Pulse: 92   Resp: 18   Temp: 97.7 °F (36.5 °C)     Physical Exam  Constitutional:       General: She is not in acute distress.     Appearance: She is obese.   HENT:      Head: Normocephalic.      Nose: Nose normal.      Mouth/Throat:      Mouth: Mucous membranes are moist.   Eyes:      Extraocular Movements: Extraocular movements intact.   Cardiovascular:      Rate and Rhythm: Normal rate.   Pulmonary:      Effort: Pulmonary effort is normal. No respiratory distress.   Abdominal:      General: Bowel sounds are normal.      Palpations: Abdomen is  soft.      Tenderness: There is no abdominal tenderness.   Musculoskeletal:         General: Normal range of motion.      Cervical back: Neck supple.   Skin:     General: Skin is warm.   Neurological:      Mental Status: She is alert and oriented to person, place, and time.         Imaging / Labs     No visits with results within 1 Day(s) from this visit.   Latest known visit with results is:   Office Visit on 04/18/2023   Component Date Value Ref Range Status    Hemoglobin A1C 04/18/2023 5.2  4.5 - 6.6 % Final    Estimated Average Glucose 04/18/2023 87  mg/dL Final    Triglycerides 04/18/2023 301 (H)  35 - 150 mg/dL Final    Cholesterol 04/18/2023 215 (H)  0 - 200 mg/dL Final    HDL Cholesterol 04/18/2023 57  40 - 60 mg/dL Final    Cholesterol/HDL Ratio (Risk Factor) 04/18/2023 3.8   Final    Non-HDL 04/18/2023 158  mg/dL Final    LDL Calculated 04/18/2023 98  mg/dL Final    LDL/HDL 04/18/2023 1.7   Final    VLDL 04/18/2023 60  mg/dL Final    TSH 04/18/2023 0.910  0.358 - 3.740 uIU/mL Final    Sodium 04/18/2023 140  136 - 145 mmol/L Final    Potassium 04/18/2023 4.5  3.5 - 5.1 mmol/L Final    Chloride 04/18/2023 104  98 - 107 mmol/L Final    CO2 04/18/2023 32  21 - 32 mmol/L Final    Anion Gap 04/18/2023 9  7 - 16 mmol/L Final    Glucose 04/18/2023 65 (L)  74 - 106 mg/dL Final    BUN 04/18/2023 8  7 - 18 mg/dL Final    Creatinine 04/18/2023 0.62  0.55 - 1.02 mg/dL Final    BUN/Creatinine Ratio 04/18/2023 13  6 - 20 Final    Calcium 04/18/2023 9.4  8.5 - 10.1 mg/dL Final    Total Protein 04/18/2023 7.7  6.4 - 8.2 g/dL Final    Albumin 04/18/2023 3.9  3.5 - 5.0 g/dL Final    Globulin 04/18/2023 3.8  2.0 - 4.0 g/dL Final    A/G Ratio 04/18/2023 1.0   Final    Bilirubin, Total 04/18/2023 0.7  >0.0 - 1.2 mg/dL Final    Alk Phos 04/18/2023 68  41 - 108 U/L Final    ALT 04/18/2023 36  13 - 56 U/L Final    AST 04/18/2023 24  15 - 37 U/L Final    eGFR 04/18/2023 109  >=60 mL/min/1.73m² Final    Vitamin D 25-Hydroxy, Blood  04/18/2023 28.0  ng/mL Final    WBC 04/18/2023 7.18  4.50 - 11.00 K/uL Final    RBC 04/18/2023 4.68  4.20 - 5.40 M/uL Final    Hemoglobin 04/18/2023 14.2  12.0 - 16.0 g/dL Final    Hematocrit 04/18/2023 43.4  38.0 - 47.0 % Final    MCV 04/18/2023 92.7  80.0 - 96.0 fL Final    MCH 04/18/2023 30.3  27.0 - 31.0 pg Final    MCHC 04/18/2023 32.7  32.0 - 36.0 g/dL Final    RDW 04/18/2023 12.9  11.5 - 14.5 % Final    Platelet Count 04/18/2023 418 (H)  150 - 400 K/uL Final    MPV 04/18/2023 8.3 (L)  9.4 - 12.4 fL Final    Neutrophils % 04/18/2023 40.5 (L)  53.0 - 65.0 % Final    Lymphocytes % 04/18/2023 46.7 (H)  27.0 - 41.0 % Final    Monocytes % 04/18/2023 10.4 (H)  2.0 - 6.0 % Final    Eosinophils % 04/18/2023 1.7  1.0 - 4.0 % Final    Basophils % 04/18/2023 0.6  0.0 - 1.0 % Final    Immature Granulocytes % 04/18/2023 0.1  0.0 - 0.4 % Final    nRBC, Auto 04/18/2023 0.0  <=0.0 % Final    Neutrophils, Abs 04/18/2023 2.91  1.80 - 7.70 K/uL Final    Lymphocytes, Absolute 04/18/2023 3.35  1.00 - 4.80 K/uL Final    Monocytes, Absolute 04/18/2023 0.75  0.00 - 0.80 K/uL Final    Eosinophils, Absolute 04/18/2023 0.12  0.00 - 0.50 K/uL Final    Basophils, Absolute 04/18/2023 0.04  0.00 - 0.20 K/uL Final    Immature Granulocytes, Absolute 04/18/2023 0.01  0.00 - 0.04 K/uL Final    nRBC, Absolute 04/18/2023 0.00  <=0.00 x10e3/uL Final    Diff Type 04/18/2023 Auto   Final     Mammo Digital Screening Bilat  Narrative: History:Screening mammogram    Bilateral full-field digital screening mammogram     Date:6/4/2020 10:06 AM    Comparison: Remote mammogram performed in [place] in 2006 is not  available for comparison    Full-field digital screening bilateral mammography is performed.   The  breasts are imaged in the CC and MLO projections.     The breast tissue is of occasional scattered fibroglandular density.  There is no spiculated mass or malignant appearing calcification. There  is a 4 mm rounded lucent benign oil cyst in the  upper outer right  breast. There are some occasional scattered benign calcifications in the  right breast. There is no skin thickening or axillary lymphadenopathy.  Impression: Impression: Benign findings. No adverse interval change. Routine yearly  screening mammography is recommended    Patient information is entered into a reminder system with target due  date for the next mammogram    BI-RADS category: 2  Benign findings    Breast density: B.  Scattered fibroglandular density    This report has been electronically signed by Zander Calderon      Assessment and Plan (including Health Maintenance)      Problem List  Smart Sets  Document Outside HM   :    Health Maintenance Due   Topic Date Due    Hepatitis C Screening  Never done    Cervical Cancer Screening  Never done    COVID-19 Vaccine (1) Never done    HIV Screening  Never done    TETANUS VACCINE  Never done    Colorectal Cancer Screening  Never done    Mammogram  06/04/2021    Shingles Vaccine (1 of 2) Never done       Problem List Items Addressed This Visit          Neuro    Frequent headaches    Current Assessment & Plan     Add topamax BID         Relevant Medications    topiramate (TOPAMAX) 50 MG tablet       Psychiatric    Generalized anxiety disorder    Current Assessment & Plan     Reports increase anxiety ans stress at home  States she is moving out of family home of over 30 years  Reports she is having severe depression  She does have caregiver with her today to monitor and support her  Will add rexulti daily with increased wellbutrin dosage  Advised her to see psychology  She does not want referral because she if moving to [place] in the next few weeks and does not want to establish care here in [place]           Relevant Medications    buPROPion (WELLBUTRIN XL) 300 MG 24 hr tablet    brexpiprazole (REXULTI) 1 mg Tab       Renal/    Vaginal dryness    Current Assessment & Plan     Estrace vaginal cream          Relevant Medications    estradioL  (ESTRACE) 0.01 % (0.1 mg/gram) vaginal cream       Endocrine    Morbid obesity with BMI of 40.0-44.9, adult - Primary    Relevant Medications    semaglutide, weight loss, (WEGOVY) 0.25 mg/0.5 mL PnIj     Other Visit Diagnoses       Metabolic syndrome        Relevant Medications    semaglutide, weight loss, (WEGOVY) 0.25 mg/0.5 mL PnIj    metFORMIN (GLUCOPHAGE) 500 MG tablet            Health Maintenance Topics with due status: Not Due       Topic Last Completion Date    Hemoglobin A1c (Diabetic Prevention Screening) 04/18/2023    Lipid Panel 04/18/2023    Influenza Vaccine Not Due       Future Appointments   Date Time Provider Department Center   7/10/2023  4:00 PM MILENA Nation [facility]          Signature:  MILENA Nation [facility] [address] [place] [address]  [phone]    Date of encounter: 6/1/23

## 2023-07-05 ENCOUNTER — PATIENT OUTREACH (OUTPATIENT)
Dept: ADMINISTRATIVE | Facility: HOSPITAL | Age: 53
End: 2023-07-05

## 2023-07-05 NOTE — PROGRESS NOTES
07/05/2023   --Chart accessed for:Care Gaps  --Care Gaps addressed:Pap Smear (Uploaded)  Outreach made to patient via n/a . (Success) (Left Message) (Unavailable)   Patient stated n/a  Care Everywhere updates requested and reviewed.  Media reports reviewed.  [facility] and [facility] reviewed.  Immunization Database ([employer]) reviewed. Vaccinations uploaded:n/a    Health Maintenance Due   Topic Date Due    Hepatitis C Screening  Never done    COVID-19 Vaccine (1) Never done    HIV Screening  Never done    TETANUS VACCINE  Never done    Colorectal Cancer Screening  Never done    Mammogram  06/04/2021    Shingles Vaccine (1 of 2) Never done

## 2023-08-08 DIAGNOSIS — I10 HYPERTENSION, UNSPECIFIED TYPE: ICD-10-CM

## 2023-08-08 RX ORDER — LISINOPRIL 20 MG/1
20 TABLET ORAL DAILY
Qty: 30 TABLET | Refills: 0 | Status: SHIPPED | OUTPATIENT
Start: 2023-08-08